# Patient Record
Sex: MALE | Race: WHITE | NOT HISPANIC OR LATINO | Employment: OTHER | URBAN - NONMETROPOLITAN AREA
[De-identification: names, ages, dates, MRNs, and addresses within clinical notes are randomized per-mention and may not be internally consistent; named-entity substitution may affect disease eponyms.]

---

## 2023-04-18 DIAGNOSIS — I10 BENIGN ESSENTIAL HYPERTENSION: ICD-10-CM

## 2023-04-18 RX ORDER — LISINOPRIL 20 MG/1
20 TABLET ORAL DAILY
Qty: 90 TABLET | Refills: 3 | Status: SHIPPED | OUTPATIENT
Start: 2023-04-18

## 2023-04-18 RX ORDER — LISINOPRIL 20 MG/1
20 TABLET ORAL DAILY
COMMUNITY
Start: 2020-09-14 | End: 2023-04-18 | Stop reason: SDUPTHER

## 2023-11-14 ENCOUNTER — OFFICE VISIT (OUTPATIENT)
Dept: PRIMARY CARE | Facility: CLINIC | Age: 63
End: 2023-11-14
Payer: OTHER GOVERNMENT

## 2023-11-14 ENCOUNTER — LAB (OUTPATIENT)
Dept: LAB | Facility: LAB | Age: 63
End: 2023-11-14
Payer: OTHER GOVERNMENT

## 2023-11-14 VITALS
SYSTOLIC BLOOD PRESSURE: 125 MMHG | HEIGHT: 72 IN | BODY MASS INDEX: 34.54 KG/M2 | HEART RATE: 77 BPM | DIASTOLIC BLOOD PRESSURE: 76 MMHG | WEIGHT: 255 LBS

## 2023-11-14 DIAGNOSIS — Z00.00 ENCOUNTER FOR GENERAL ADULT MEDICAL EXAMINATION WITHOUT ABNORMAL FINDINGS: Primary | ICD-10-CM

## 2023-11-14 DIAGNOSIS — R73.02 GLUCOSE INTOLERANCE (IMPAIRED GLUCOSE TOLERANCE): ICD-10-CM

## 2023-11-14 DIAGNOSIS — E78.00 HYPERCHOLESTEREMIA: ICD-10-CM

## 2023-11-14 DIAGNOSIS — R73.02 IMPAIRED GLUCOSE TOLERANCE (ORAL): ICD-10-CM

## 2023-11-14 DIAGNOSIS — E78.00 PURE HYPERCHOLESTEROLEMIA, UNSPECIFIED: ICD-10-CM

## 2023-11-14 DIAGNOSIS — Z00.00 ENCOUNTER FOR WELLNESS EXAMINATION IN ADULT: Primary | ICD-10-CM

## 2023-11-14 DIAGNOSIS — I10 BENIGN ESSENTIAL HYPERTENSION: ICD-10-CM

## 2023-11-14 DIAGNOSIS — Z12.5 SCREENING PSA (PROSTATE SPECIFIC ANTIGEN): ICD-10-CM

## 2023-11-14 DIAGNOSIS — G47.33 OBSTRUCTIVE SLEEP APNEA, ADULT: ICD-10-CM

## 2023-11-14 PROBLEM — D12.6 TUBULAR ADENOMA OF COLON: Status: RESOLVED | Noted: 2023-11-14 | Resolved: 2023-11-14

## 2023-11-14 PROBLEM — G47.10 HYPERSOMNIA: Status: ACTIVE | Noted: 2023-11-14

## 2023-11-14 PROBLEM — H91.93 BILATERAL HEARING LOSS: Status: ACTIVE | Noted: 2023-11-14

## 2023-11-14 LAB
ALBUMIN SERPL BCP-MCNC: 4.7 G/DL (ref 3.4–5)
ALP SERPL-CCNC: 65 U/L (ref 33–136)
ALT SERPL W P-5'-P-CCNC: 20 U/L (ref 10–52)
ANION GAP SERPL CALC-SCNC: 10 MMOL/L (ref 10–20)
AST SERPL W P-5'-P-CCNC: 17 U/L (ref 9–39)
BASOPHILS # BLD AUTO: 0.05 X10*3/UL (ref 0–0.1)
BASOPHILS NFR BLD AUTO: 0.8 %
BILIRUB SERPL-MCNC: 0.6 MG/DL (ref 0–1.2)
BUN SERPL-MCNC: 14 MG/DL (ref 6–23)
CALCIUM SERPL-MCNC: 9.4 MG/DL (ref 8.6–10.3)
CHLORIDE SERPL-SCNC: 103 MMOL/L (ref 98–107)
CHOLEST SERPL-MCNC: 186 MG/DL (ref 0–199)
CHOLESTEROL/HDL RATIO: 5.5
CO2 SERPL-SCNC: 30 MMOL/L (ref 21–32)
CREAT SERPL-MCNC: 0.91 MG/DL (ref 0.5–1.3)
EOSINOPHIL # BLD AUTO: 0.19 X10*3/UL (ref 0–0.7)
EOSINOPHIL NFR BLD AUTO: 3 %
ERYTHROCYTE [DISTWIDTH] IN BLOOD BY AUTOMATED COUNT: 12.6 % (ref 11.5–14.5)
EST. AVERAGE GLUCOSE BLD GHB EST-MCNC: 108 MG/DL
GFR SERPL CREATININE-BSD FRML MDRD: >90 ML/MIN/1.73M*2
GLUCOSE SERPL-MCNC: 107 MG/DL (ref 74–99)
HBA1C MFR BLD: 5.4 %
HCT VFR BLD AUTO: 45.9 % (ref 41–52)
HDLC SERPL-MCNC: 34 MG/DL
HGB BLD-MCNC: 14.7 G/DL (ref 13.5–17.5)
IMM GRANULOCYTES # BLD AUTO: 0.01 X10*3/UL (ref 0–0.7)
IMM GRANULOCYTES NFR BLD AUTO: 0.2 % (ref 0–0.9)
LDLC SERPL CALC-MCNC: 122 MG/DL
LYMPHOCYTES # BLD AUTO: 1.88 X10*3/UL (ref 1.2–4.8)
LYMPHOCYTES NFR BLD AUTO: 29.8 %
MCH RBC QN AUTO: 29.4 PG (ref 26–34)
MCHC RBC AUTO-ENTMCNC: 32 G/DL (ref 32–36)
MCV RBC AUTO: 92 FL (ref 80–100)
MONOCYTES # BLD AUTO: 0.51 X10*3/UL (ref 0.1–1)
MONOCYTES NFR BLD AUTO: 8.1 %
NEUTROPHILS # BLD AUTO: 3.67 X10*3/UL (ref 1.2–7.7)
NEUTROPHILS NFR BLD AUTO: 58.1 %
NON HDL CHOLESTEROL: 152 MG/DL (ref 0–149)
NRBC BLD-RTO: 0 /100 WBCS (ref 0–0)
PLATELET # BLD AUTO: 301 X10*3/UL (ref 150–450)
POTASSIUM SERPL-SCNC: 4.9 MMOL/L (ref 3.5–5.3)
PROT SERPL-MCNC: 7 G/DL (ref 6.4–8.2)
PSA SERPL-MCNC: 0.47 NG/ML
RBC # BLD AUTO: 5 X10*6/UL (ref 4.5–5.9)
SODIUM SERPL-SCNC: 138 MMOL/L (ref 136–145)
TRIGL SERPL-MCNC: 152 MG/DL (ref 0–149)
TSH SERPL-ACNC: 1.5 MIU/L (ref 0.44–3.98)
VLDL: 30 MG/DL (ref 0–40)
WBC # BLD AUTO: 6.3 X10*3/UL (ref 4.4–11.3)

## 2023-11-14 PROCEDURE — 84443 ASSAY THYROID STIM HORMONE: CPT

## 2023-11-14 PROCEDURE — 99396 PREV VISIT EST AGE 40-64: CPT | Performed by: PHYSICIAN ASSISTANT

## 2023-11-14 PROCEDURE — 84153 ASSAY OF PSA TOTAL: CPT

## 2023-11-14 PROCEDURE — 3078F DIAST BP <80 MM HG: CPT | Performed by: PHYSICIAN ASSISTANT

## 2023-11-14 PROCEDURE — 36415 COLL VENOUS BLD VENIPUNCTURE: CPT

## 2023-11-14 PROCEDURE — 3074F SYST BP LT 130 MM HG: CPT | Performed by: PHYSICIAN ASSISTANT

## 2023-11-14 PROCEDURE — 80053 COMPREHEN METABOLIC PANEL: CPT

## 2023-11-14 PROCEDURE — 80061 LIPID PANEL: CPT

## 2023-11-14 PROCEDURE — 85025 COMPLETE CBC W/AUTO DIFF WBC: CPT

## 2023-11-14 PROCEDURE — 1036F TOBACCO NON-USER: CPT | Performed by: PHYSICIAN ASSISTANT

## 2023-11-14 PROCEDURE — 83036 HEMOGLOBIN GLYCOSYLATED A1C: CPT

## 2023-11-14 ASSESSMENT — PATIENT HEALTH QUESTIONNAIRE - PHQ9
SUM OF ALL RESPONSES TO PHQ9 QUESTIONS 1 AND 2: 0
2. FEELING DOWN, DEPRESSED OR HOPELESS: NOT AT ALL
1. LITTLE INTEREST OR PLEASURE IN DOING THINGS: NOT AT ALL

## 2023-11-14 ASSESSMENT — ENCOUNTER SYMPTOMS
EYE DISCHARGE: 0
SHORTNESS OF BREATH: 0
CHILLS: 0
ABDOMINAL PAIN: 0
CONFUSION: 0
SINUS PAIN: 0
HEADACHES: 0
PALPITATIONS: 0
DIZZINESS: 0
TREMORS: 0
NAUSEA: 0
NECK PAIN: 0
SLEEP DISTURBANCE: 0
FREQUENCY: 0
CONSTIPATION: 0
NUMBNESS: 0
CHEST TIGHTNESS: 0
BACK PAIN: 0
WOUND: 0
BRUISES/BLEEDS EASILY: 0
WHEEZING: 0
COUGH: 0
EYE REDNESS: 0
FATIGUE: 0
SORE THROAT: 0
RHINORRHEA: 0
FEVER: 0
DIARRHEA: 0
VOMITING: 0
FLANK PAIN: 0

## 2023-11-14 NOTE — PROGRESS NOTES
Subjective   Patient ID: Paul Son is a 63 y.o. male who presents for Follow-up (WELLNESS WITH LABS)    HPI   wellness visit     - labs     - meds  HTN - stable on meds   SINDY -- pt is using [employer] in [place] and has tried 2 diff masks but cont to struggle using CPAP and often finds he is taking it off in the middle of the night. he cont to wear it as best as he can. He states he was told he already passed is compliance - will see if we can get record of this. He is using as faithful as he can but unfort is not sure he is seeing much benefit yet at this time -will cont to use     - Preventative Testing   PSA - oct 2023  colonoscopy - aug 2014- repeat 10 years per patient however I have found report and see that he has hx of tubular adenoma - I explained typically repeat would be 3 years with this type of polyps - he will call when he is ready to pursue    - vaccines  flu - declines  PNA- due age 65  shingles - discussed shingrix   Tdap - June 2014  MMR- pt denies wanting titer  Hep B - pt denies wanting titer  COVID - 2 shots and booster   RSV    - other Providers   Dentist - dr adams - goes 1-2 x/year   eye - - pt goes at least 1 /years         Patient Active Problem List   Diagnosis    Benign essential hypertension    Bilateral hearing loss    Glucose intolerance (impaired glucose tolerance)    Hypercholesteremia    Hypersomnia    Obstructive sleep apnea, adult       Review of Systems   Constitutional:  Negative for chills, fatigue and fever.   HENT:  Negative for congestion, rhinorrhea, sinus pain, sore throat and tinnitus.    Eyes:  Negative for discharge, redness and visual disturbance.   Respiratory:  Negative for cough, chest tightness, shortness of breath and wheezing.    Cardiovascular:  Negative for chest pain, palpitations and leg swelling.   Gastrointestinal:  Negative for abdominal pain, constipation, diarrhea, nausea and vomiting.   Endocrine: Negative for cold intolerance and heat  intolerance.   Genitourinary:  Negative for flank pain, frequency and urgency.   Musculoskeletal:  Negative for back pain, gait problem and neck pain.   Skin:  Negative for rash and wound.   Neurological:  Negative for dizziness, tremors, syncope, numbness and headaches.   Hematological:  Does not bruise/bleed easily.   Psychiatric/Behavioral:  Negative for confusion, sleep disturbance and suicidal ideas.        Past Medical History:   Diagnosis Date    Encounter for screening for malignant neoplasm of prostate     Prostate cancer screening    Other specified health status     No pertinent past medical history    Tubular adenoma of colon 11/14/2023       Past Surgical History:   Procedure Laterality Date    COLONOSCOPY  08/17/2014    POLYP REPEAT 3 YRS    OTHER SURGICAL HISTORY  10/01/2020    Tonsillectomy    OTHER SURGICAL HISTORY  10/08/2021    Cataract surgery       No family history on file.    Social History     Tobacco Use    Smoking status: Former     Types: Cigarettes    Smokeless tobacco: Never   Vaping Use    Vaping Use: Never used   Substance Use Topics    Alcohol use: Never    Drug use: Never       No Known Allergies    Current Outpatient Medications   Medication Sig Dispense Refill    lisinopril 20 mg tablet Take 1 tablet (20 mg) by mouth once daily. 90 tablet 3     No current facility-administered medications for this visit.       Objective   /76   Pulse 77   Ht 1.829 m (6')   Wt 116 kg (255 lb)   BMI 34.58 kg/m²     Physical Exam  Vitals reviewed.   Constitutional:       Appearance: Normal appearance. He is obese.   HENT:      Head: Normocephalic.      Right Ear: External ear normal.      Left Ear: External ear normal.      Nose: Nose normal. No congestion or rhinorrhea.      Mouth/Throat:      Mouth: Mucous membranes are moist.   Eyes:      Extraocular Movements: Extraocular movements intact.      Conjunctiva/sclera: Conjunctivae normal.      Pupils: Pupils are equal, round, and reactive to  light.   Cardiovascular:      Rate and Rhythm: Normal rate and regular rhythm.      Pulses: Normal pulses.   Pulmonary:      Effort: Pulmonary effort is normal.      Breath sounds: Normal breath sounds.   Abdominal:      General: Bowel sounds are normal.      Palpations: Abdomen is soft.      Tenderness: There is no abdominal tenderness. There is no right CVA tenderness or left CVA tenderness.   Musculoskeletal:         General: No tenderness. Normal range of motion.      Cervical back: Normal range of motion and neck supple. No tenderness.   Skin:     General: Skin is warm and dry.   Neurological:      General: No focal deficit present.      Mental Status: He is alert and oriented to person, place, and time.   Psychiatric:         Mood and Affect: Mood normal.         Behavior: Behavior normal.         Testing   Component      Latest Ref [facility] 11/14/2023   WBC      4.4 - 11.3 x10*3/uL 6.3    nRBC      0.0 - 0.0 /100 WBCs 0.0    RBC      4.50 - 5.90 x10*6/uL 5.00    HEMOGLOBIN      13.5 - 17.5 g/dL 14.7    HEMATOCRIT      41.0 - 52.0 % 45.9    MCV      80 - 100 fL 92    MCH      26.0 - 34.0 pg 29.4    MCHC      32.0 - 36.0 g/dL 32.0    RED CELL DISTRIBUTION WIDTH      11.5 - 14.5 % 12.6    Platelets      150 - 450 x10*3/uL 301    Neutrophils %      40.0 - 80.0 % 58.1    Immature Granulocytes %, Automated      0.0 - 0.9 % 0.2    Lymphocytes %      13.0 - 44.0 % 29.8    Monocytes %      2.0 - 10.0 % 8.1    Eosinophils %      0.0 - 6.0 % 3.0    Basophils %      0.0 - 2.0 % 0.8    Neutrophils Absolute      1.20 - 7.70 x10*3/uL 3.67    Immature Granulocytes Absolute, Automated      0.00 - 0.70 x10*3/uL 0.01    Lymphocytes Absolute      1.20 - 4.80 x10*3/uL 1.88    Monocytes Absolute      0.10 - 1.00 x10*3/uL 0.51    Eosinophils Absolute      0.00 - 0.70 x10*3/uL 0.19    Basophils Absolute      0.00 - 0.10 x10*3/uL 0.05    GLUCOSE      74 - 99 mg/dL 107 (H)    SODIUM      136 - 145 mmol/L 138    POTASSIUM      3.5 - 5.3  mmol/L 4.9    CHLORIDE      98 - 107 mmol/L 103    Bicarbonate      21 - 32 mmol/L 30    Anion Gap      10 - 20 mmol/L 10    Blood Urea Nitrogen      6 - 23 mg/dL 14    Creatinine      0.50 - 1.30 mg/dL 0.91    EGFR      >60 mL/min/1.73m*2 >90    Calcium      8.6 - 10.3 mg/dL 9.4    Albumin      3.4 - 5.0 g/dL 4.7    Alkaline Phosphatase      33 - 136 U/L 65    Total Protein      6.4 - 8.2 g/dL 7.0    AST      9 - 39 U/L 17    Bilirubin Total      0.0 - 1.2 mg/dL 0.6    ALT      10 - 52 U/L 20    CHOLESTEROL      0 - 199 mg/dL 186    HDL CHOLESTEROL      mg/dL 34.0    Cholesterol/HDL Ratio 5.5    LDL Calculated      <=99 mg/dL 122 (H)    VLDL      0 - 40 mg/dL 30    TRIGLYCERIDES      0 - 149 mg/dL 152 (H)    Non HDL Cholesterol      0 - 149 mg/dL 152 (H)    Hemoglobin A1C      see below % 5.4    Estimated Average Glucose      Not Established mg/dL 108    Thyroid Stimulating Hormone      0.44 - 3.98 mIU/L 1.50    PSA      <=4.00 ng/mL 0.47       Impression    MDM    1) COMPLEXITY: MORE THAN 1 STABLE CHRONIC CONDITION ADDRESSED  2)DATA: TESTS INTERPRETED AND OR ORDERED, TOOK INDEPENDENT HISTORY OR RECORDS REVIEWED  3)RISK: MODERATE RISK DUE TO NATURE OF MEDICAL CONDITIONS/COMORBIDITY OR MEDICATIONS ORDERED OR SURGICAL OR PROCEDURE REFERRAL, .       Reviewed labs and Testing on file   Patient to follow diet low in cholesterol, fat, and sodium.    Patient is advised to increase Exercise.  Patient is recommended to lose weight.  Reviewed Meds and discussed common side effects  Continue as directed   Patient is strongly advised to be compliant with recommendations.    Return to Clinic sooner if needed.  Patient denies further questions/concerns at this time     Assessment/Plan   Problem List Items Addressed This Visit             ICD-10-CM    Benign essential hypertension I10    Glucose intolerance (impaired glucose tolerance) R73.02    Relevant Orders    CBC and Auto Differential    Comprehensive Metabolic Panel     Hemoglobin A1C    Lipid Panel    Magnesium    Vitamin B12    Hypercholesteremia E78.00    Relevant Orders    CBC and Auto Differential    Comprehensive Metabolic Panel    Hemoglobin A1C    Lipid Panel    Magnesium    Vitamin B12    Obstructive sleep apnea, adult G47.33     Other Visit Diagnoses         Codes    Encounter for wellness examination in adult    -  Primary Z00.00    Relevant Orders    CBC and Auto Differential    Comprehensive Metabolic Panel    Hemoglobin A1C    Lipid Panel    Magnesium    Vitamin B12    Screening PSA (prostate specific antigen)     Z12.5    Relevant Orders    Prostate Specific Antigen, Screen             FU in 12 + 1 with wellness and LABS at [facility] fasting and med check

## 2023-11-20 ENCOUNTER — APPOINTMENT (OUTPATIENT)
Dept: PRIMARY CARE | Facility: CLINIC | Age: 63
End: 2023-11-20
Payer: OTHER GOVERNMENT

## 2024-07-11 DIAGNOSIS — I10 BENIGN ESSENTIAL HYPERTENSION: ICD-10-CM

## 2024-07-11 RX ORDER — LISINOPRIL 20 MG/1
20 TABLET ORAL DAILY
Qty: 14 TABLET | Refills: 0 | Status: SHIPPED | OUTPATIENT
Start: 2024-07-11 | End: 2025-07-11

## 2024-07-11 RX ORDER — LISINOPRIL 20 MG/1
20 TABLET ORAL DAILY
Qty: 90 TABLET | Refills: 3 | Status: SHIPPED | OUTPATIENT
Start: 2024-07-11

## 2024-11-18 ENCOUNTER — APPOINTMENT (OUTPATIENT)
Dept: PRIMARY CARE | Facility: CLINIC | Age: 64
End: 2024-11-18
Payer: OTHER GOVERNMENT

## 2024-11-19 ENCOUNTER — TELEPHONE (OUTPATIENT)
Dept: PRIMARY CARE | Facility: CLINIC | Age: 64
End: 2024-11-19
Payer: OTHER GOVERNMENT

## 2024-11-19 DIAGNOSIS — Z12.5 SCREENING PSA (PROSTATE SPECIFIC ANTIGEN): ICD-10-CM

## 2024-11-19 DIAGNOSIS — E78.00 HYPERCHOLESTEREMIA: ICD-10-CM

## 2024-11-19 DIAGNOSIS — Z00.00 ENCOUNTER FOR WELLNESS EXAMINATION IN ADULT: Primary | ICD-10-CM

## 2024-11-19 DIAGNOSIS — R73.02 GLUCOSE INTOLERANCE (IMPAIRED GLUCOSE TOLERANCE): ICD-10-CM

## 2024-11-19 DIAGNOSIS — I10 BENIGN ESSENTIAL HYPERTENSION: ICD-10-CM

## 2024-11-23 ENCOUNTER — LAB (OUTPATIENT)
Dept: LAB | Facility: LAB | Age: 64
End: 2024-11-23
Payer: OTHER GOVERNMENT

## 2024-11-23 DIAGNOSIS — Z00.00 ENCOUNTER FOR WELLNESS EXAMINATION IN ADULT: ICD-10-CM

## 2024-11-23 DIAGNOSIS — Z12.5 SCREENING PSA (PROSTATE SPECIFIC ANTIGEN): ICD-10-CM

## 2024-11-23 DIAGNOSIS — I10 BENIGN ESSENTIAL HYPERTENSION: ICD-10-CM

## 2024-11-23 DIAGNOSIS — E78.00 HYPERCHOLESTEREMIA: ICD-10-CM

## 2024-11-23 DIAGNOSIS — R73.02 GLUCOSE INTOLERANCE (IMPAIRED GLUCOSE TOLERANCE): ICD-10-CM

## 2024-11-23 LAB
ALBUMIN SERPL BCP-MCNC: 4.3 G/DL (ref 3.4–5)
ALP SERPL-CCNC: 69 U/L (ref 33–136)
ALT SERPL W P-5'-P-CCNC: 40 U/L (ref 10–52)
ANION GAP SERPL CALC-SCNC: 10 MMOL/L (ref 10–20)
AST SERPL W P-5'-P-CCNC: 28 U/L (ref 9–39)
BASOPHILS # BLD AUTO: 0.05 X10*3/UL (ref 0–0.1)
BASOPHILS NFR BLD AUTO: 0.8 %
BILIRUB SERPL-MCNC: 0.7 MG/DL (ref 0–1.2)
BUN SERPL-MCNC: 15 MG/DL (ref 6–23)
CALCIUM SERPL-MCNC: 8.9 MG/DL (ref 8.6–10.3)
CHLORIDE SERPL-SCNC: 106 MMOL/L (ref 98–107)
CHOLEST SERPL-MCNC: 188 MG/DL (ref 0–199)
CHOLESTEROL/HDL RATIO: 6.1
CO2 SERPL-SCNC: 29 MMOL/L (ref 21–32)
CREAT SERPL-MCNC: 0.82 MG/DL (ref 0.5–1.3)
EGFRCR SERPLBLD CKD-EPI 2021: >90 ML/MIN/1.73M*2
EOSINOPHIL # BLD AUTO: 0.33 X10*3/UL (ref 0–0.7)
EOSINOPHIL NFR BLD AUTO: 5.5 %
ERYTHROCYTE [DISTWIDTH] IN BLOOD BY AUTOMATED COUNT: 12.7 % (ref 11.5–14.5)
EST. AVERAGE GLUCOSE BLD GHB EST-MCNC: 114 MG/DL
GLUCOSE SERPL-MCNC: 97 MG/DL (ref 74–99)
HBA1C MFR BLD: 5.6 %
HCT VFR BLD AUTO: 46 % (ref 41–52)
HDLC SERPL-MCNC: 31 MG/DL
HGB BLD-MCNC: 14.9 G/DL (ref 13.5–17.5)
IMM GRANULOCYTES # BLD AUTO: 0.01 X10*3/UL (ref 0–0.7)
IMM GRANULOCYTES NFR BLD AUTO: 0.2 % (ref 0–0.9)
LDLC SERPL CALC-MCNC: 119 MG/DL
LYMPHOCYTES # BLD AUTO: 2.15 X10*3/UL (ref 1.2–4.8)
LYMPHOCYTES NFR BLD AUTO: 36.1 %
MAGNESIUM SERPL-MCNC: 2.12 MG/DL (ref 1.6–2.4)
MCH RBC QN AUTO: 29.7 PG (ref 26–34)
MCHC RBC AUTO-ENTMCNC: 32.4 G/DL (ref 32–36)
MCV RBC AUTO: 92 FL (ref 80–100)
MONOCYTES # BLD AUTO: 0.51 X10*3/UL (ref 0.1–1)
MONOCYTES NFR BLD AUTO: 8.6 %
NEUTROPHILS # BLD AUTO: 2.9 X10*3/UL (ref 1.2–7.7)
NEUTROPHILS NFR BLD AUTO: 48.8 %
NON HDL CHOLESTEROL: 157 MG/DL (ref 0–149)
NRBC BLD-RTO: 0 /100 WBCS (ref 0–0)
PLATELET # BLD AUTO: 260 X10*3/UL (ref 150–450)
POTASSIUM SERPL-SCNC: 4.7 MMOL/L (ref 3.5–5.3)
PROT SERPL-MCNC: 6.3 G/DL (ref 6.4–8.2)
PSA SERPL-MCNC: 0.39 NG/ML
RBC # BLD AUTO: 5.01 X10*6/UL (ref 4.5–5.9)
SODIUM SERPL-SCNC: 140 MMOL/L (ref 136–145)
TRIGL SERPL-MCNC: 189 MG/DL (ref 0–149)
TSH SERPL-ACNC: 1.16 MIU/L (ref 0.44–3.98)
VIT B12 SERPL-MCNC: 185 PG/ML (ref 211–911)
VLDL: 38 MG/DL (ref 0–40)
WBC # BLD AUTO: 6 X10*3/UL (ref 4.4–11.3)

## 2024-11-23 PROCEDURE — 80061 LIPID PANEL: CPT

## 2024-11-23 PROCEDURE — 83735 ASSAY OF MAGNESIUM: CPT

## 2024-11-23 PROCEDURE — 80053 COMPREHEN METABOLIC PANEL: CPT

## 2024-11-23 PROCEDURE — 85025 COMPLETE CBC W/AUTO DIFF WBC: CPT

## 2024-11-23 PROCEDURE — 83036 HEMOGLOBIN GLYCOSYLATED A1C: CPT

## 2024-11-23 PROCEDURE — 36415 COLL VENOUS BLD VENIPUNCTURE: CPT

## 2024-11-23 PROCEDURE — 84153 ASSAY OF PSA TOTAL: CPT

## 2024-11-23 PROCEDURE — 82607 VITAMIN B-12: CPT

## 2024-11-23 PROCEDURE — 84443 ASSAY THYROID STIM HORMONE: CPT

## 2024-11-27 ENCOUNTER — APPOINTMENT (OUTPATIENT)
Dept: PRIMARY CARE | Facility: CLINIC | Age: 64
End: 2024-11-27
Payer: OTHER GOVERNMENT

## 2024-11-27 VITALS
SYSTOLIC BLOOD PRESSURE: 136 MMHG | HEIGHT: 72 IN | WEIGHT: 271.4 LBS | HEART RATE: 60 BPM | BODY MASS INDEX: 36.76 KG/M2 | DIASTOLIC BLOOD PRESSURE: 64 MMHG

## 2024-11-27 DIAGNOSIS — Z00.00 ENCOUNTER FOR WELLNESS EXAMINATION IN ADULT: Primary | ICD-10-CM

## 2024-11-27 DIAGNOSIS — G47.33 OBSTRUCTIVE SLEEP APNEA, ADULT: ICD-10-CM

## 2024-11-27 DIAGNOSIS — R73.02 GLUCOSE INTOLERANCE (IMPAIRED GLUCOSE TOLERANCE): ICD-10-CM

## 2024-11-27 DIAGNOSIS — Z12.5 SCREENING PSA (PROSTATE SPECIFIC ANTIGEN): ICD-10-CM

## 2024-11-27 DIAGNOSIS — E78.00 HYPERCHOLESTEREMIA: ICD-10-CM

## 2024-11-27 DIAGNOSIS — I10 BENIGN ESSENTIAL HYPERTENSION: ICD-10-CM

## 2024-11-27 DIAGNOSIS — E53.8 VITAMIN B12 DEFICIENCY: ICD-10-CM

## 2024-11-27 PROCEDURE — 99396 PREV VISIT EST AGE 40-64: CPT | Performed by: PHYSICIAN ASSISTANT

## 2024-11-27 PROCEDURE — 3075F SYST BP GE 130 - 139MM HG: CPT | Performed by: PHYSICIAN ASSISTANT

## 2024-11-27 PROCEDURE — 3008F BODY MASS INDEX DOCD: CPT | Performed by: PHYSICIAN ASSISTANT

## 2024-11-27 PROCEDURE — 3078F DIAST BP <80 MM HG: CPT | Performed by: PHYSICIAN ASSISTANT

## 2024-11-27 PROCEDURE — 1036F TOBACCO NON-USER: CPT | Performed by: PHYSICIAN ASSISTANT

## 2024-11-27 RX ORDER — LANOLIN ALCOHOL/MO/W.PET/CERES
1000 CREAM (GRAM) TOPICAL DAILY
Qty: 90 TABLET | Refills: 3 | Status: SHIPPED | OUTPATIENT
Start: 2024-11-27 | End: 2025-11-27

## 2024-11-27 ASSESSMENT — ENCOUNTER SYMPTOMS
EYE REDNESS: 0
BRUISES/BLEEDS EASILY: 0
FLANK PAIN: 0
CONSTIPATION: 0
WHEEZING: 0
COUGH: 0
RHINORRHEA: 0
FEVER: 0
BACK PAIN: 0
WOUND: 0
SLEEP DISTURBANCE: 0
CONFUSION: 0
SINUS PAIN: 0
CHILLS: 0
SORE THROAT: 0
NUMBNESS: 0
DIZZINESS: 0
CHEST TIGHTNESS: 0
EYE DISCHARGE: 0
TREMORS: 0
FREQUENCY: 0
DIARRHEA: 0
NAUSEA: 0
VOMITING: 0
ABDOMINAL PAIN: 0
PALPITATIONS: 0
SHORTNESS OF BREATH: 0
NECK PAIN: 0
HEADACHES: 0
FATIGUE: 0

## 2024-11-27 ASSESSMENT — PATIENT HEALTH QUESTIONNAIRE - PHQ9
1. LITTLE INTEREST OR PLEASURE IN DOING THINGS: NOT AT ALL
2. FEELING DOWN, DEPRESSED OR HOPELESS: NOT AT ALL
SUM OF ALL RESPONSES TO PHQ9 QUESTIONS 1 AND 2: 0

## 2024-11-27 NOTE — PROGRESS NOTES
Subjective   Patient ID: Paul Son is a 64 y.o. male who presents for Follow-up (WELLNESS WITH LABS)    [facility]       wellness visit      - labs      - meds  HTN - stable on meds   SINDY -- has CPAP but not using - struggling to use      - Preventative Testing   PSA - Nov 2024   colonoscopy - aug 2014- repeat 10 years per patient however I have found report and see that he has hx of tubular adenoma - I explained typically repeat would be 3 years with this type of polyps - he will call when he is ready to pursue     - vaccines  flu - declines  PNA- due age 65  shingles - discussed shingrix   Tdap - June 2014  MMR- pt denies wanting titer  Hep B - pt denies wanting titer  COVID - 2 shots and booster   RSV     - other Providers   Dentist - dr adams - goes 1-2 x/year   eye - - pt goes at least 1 /years             Patient Active Problem List   Diagnosis    Benign essential hypertension    Bilateral hearing loss    Glucose intolerance (impaired glucose tolerance)    Hypercholesteremia    Hypersomnia    Obstructive sleep apnea, adult       Review of Systems   Constitutional:  Negative for chills, fatigue and fever.   HENT:  Negative for congestion, rhinorrhea, sinus pain, sore throat and tinnitus.    Eyes:  Negative for discharge, redness and visual disturbance.   Respiratory:  Negative for cough, chest tightness, shortness of breath and wheezing.    Cardiovascular:  Negative for chest pain, palpitations and leg swelling.   Gastrointestinal:  Negative for abdominal pain, constipation, diarrhea, nausea and vomiting.   Endocrine: Negative for cold intolerance and heat intolerance.   Genitourinary:  Negative for flank pain, frequency and urgency.   Musculoskeletal:  Negative for back pain, gait problem and neck pain.   Skin:  Negative for rash and wound.   Neurological:  Negative for dizziness, tremors, syncope, numbness and headaches.   Hematological:  Does not bruise/bleed easily.   Psychiatric/Behavioral:  Negative  for confusion, sleep disturbance and suicidal ideas.        Past Medical History:   Diagnosis Date    Encounter for screening for malignant neoplasm of prostate     Prostate cancer screening    Other specified health status     No pertinent past medical history    Tubular adenoma of colon 11/14/2023       Past Surgical History:   Procedure Laterality Date    COLONOSCOPY  08/17/2014    POLYP REPEAT 3 YRS    OTHER SURGICAL HISTORY  10/01/2020    Tonsillectomy    OTHER SURGICAL HISTORY  10/08/2021    Cataract surgery       Family History   Problem Relation Name Age of Onset    Breast cancer Mother      Leukemia Father      Colon cancer Father         Social History     Tobacco Use    Smoking status: Former     Types: Cigarettes    Smokeless tobacco: Never   Vaping Use    Vaping status: Never Used   Substance Use Topics    Alcohol use: Never    Drug use: Never       No Known Allergies    Current Outpatient Medications   Medication Sig Dispense Refill    lisinopril 20 mg tablet Take 1 tablet (20 mg) by mouth once daily. 90 tablet 3    lisinopril 20 mg tablet Take 1 tablet (20 mg) by mouth once daily. 14 tablet 0     No current facility-administered medications for this visit.       Objective   /64   Pulse 60   Ht 1.829 m (6')   Wt 123 kg (271 lb 6.4 oz)   BMI 36.81 kg/m²     Physical Exam  Vitals reviewed.   Constitutional:       Appearance: Normal appearance. He is obese.   HENT:      Head: Normocephalic.      Right Ear: External ear normal.      Left Ear: External ear normal.      Nose: Nose normal. No congestion or rhinorrhea.      Mouth/Throat:      Mouth: Mucous membranes are moist.   Eyes:      Extraocular Movements: Extraocular movements intact.      Conjunctiva/sclera: Conjunctivae normal.      Pupils: Pupils are equal, round, and reactive to light.   Cardiovascular:      Rate and Rhythm: Normal rate and regular rhythm.      Pulses: Normal pulses.   Pulmonary:      Effort: Pulmonary effort is normal.       Breath sounds: Normal breath sounds.   Abdominal:      General: Bowel sounds are normal.      Palpations: Abdomen is soft.      Tenderness: There is no abdominal tenderness. There is no right CVA tenderness or left CVA tenderness.   Musculoskeletal:         General: No tenderness. Normal range of motion.      Cervical back: Normal range of motion and neck supple. No tenderness.   Skin:     General: Skin is warm and dry.   Neurological:      General: No focal deficit present.      Mental Status: He is alert and oriented to person, place, and time.   Psychiatric:         Mood and Affect: Mood normal.         Behavior: Behavior normal.     Testing   Component      Latest Ref [facility] 11/23/2024   WBC      4.4 - 11.3 x10*3/uL 6.0    nRBC      0.0 - 0.0 /100 WBCs 0.0    RBC      4.50 - 5.90 x10*6/uL 5.01    HEMOGLOBIN      13.5 - 17.5 g/dL 14.9    HEMATOCRIT      41.0 - 52.0 % 46.0    MCV      80 - 100 fL 92    MCH      26.0 - 34.0 pg 29.7    MCHC      32.0 - 36.0 g/dL 32.4    RED CELL DISTRIBUTION WIDTH      11.5 - 14.5 % 12.7    Platelets      150 - 450 x10*3/uL 260    Neutrophils %      40.0 - 80.0 % 48.8    Immature Granulocytes %, Automated      0.0 - 0.9 % 0.2    Lymphocytes %      13.0 - 44.0 % 36.1    Monocytes %      2.0 - 10.0 % 8.6    Eosinophils %      0.0 - 6.0 % 5.5    Basophils %      0.0 - 2.0 % 0.8    Neutrophils Absolute      1.20 - 7.70 x10*3/uL 2.90    Immature Granulocytes Absolute, Automated      0.00 - 0.70 x10*3/uL 0.01    Lymphocytes Absolute      1.20 - 4.80 x10*3/uL 2.15    Monocytes Absolute      0.10 - 1.00 x10*3/uL 0.51    Eosinophils Absolute      0.00 - 0.70 x10*3/uL 0.33    Basophils Absolute      0.00 - 0.10 x10*3/uL 0.05    GLUCOSE      74 - 99 mg/dL 97    SODIUM      136 - 145 mmol/L 140    POTASSIUM      3.5 - 5.3 mmol/L 4.7    CHLORIDE      98 - 107 mmol/L 106    Bicarbonate      21 - 32 mmol/L 29    Anion Gap      10 - 20 mmol/L 10    Blood Urea Nitrogen      6 - 23 mg/dL 15     Creatinine      0.50 - 1.30 mg/dL 0.82    EGFR      >60 mL/min/1.73m*2 >90    Calcium      8.6 - 10.3 mg/dL 8.9    Albumin      3.4 - 5.0 g/dL 4.3    Alkaline Phosphatase      33 - 136 U/L 69    Total Protein      6.4 - 8.2 g/dL 6.3 (L)    AST      9 - 39 U/L 28    Bilirubin Total      0.0 - 1.2 mg/dL 0.7    ALT      10 - 52 U/L 40    CHOLESTEROL      0 - 199 mg/dL 188    HDL CHOLESTEROL      mg/dL 31.0    Cholesterol/HDL Ratio 6.1    LDL Calculated      <=99 mg/dL 119 (H)    VLDL      0 - 40 mg/dL 38    TRIGLYCERIDES      0 - 149 mg/dL 189 (H)    Non HDL Cholesterol      0 - 149 mg/dL 157 (H)    Hemoglobin A1C      See comment % 5.6    Estimated Average Glucose      Not Established mg/dL 114    Thyroid Stimulating Hormone      0.44 - 3.98 mIU/L 1.16    Prostate Specific Antigen,Screen      <=4.00 ng/mL 0.39    MAGNESIUM      1.60 - 2.40 mg/dL 2.12    Vitamin B12      211 - 911 pg/mL 185 (L)       Impression    MDM    1) COMPLEXITY: MORE THAN 1 STABLE CHRONIC CONDITION ADDRESSED  2)DATA: TESTS INTERPRETED AND OR ORDERED, TOOK INDEPENDENT HISTORY OR RECORDS REVIEWED  3)RISK: MODERATE RISK DUE TO NATURE OF MEDICAL CONDITIONS/COMORBIDITY OR MEDICATIONS ORDERED OR SURGICAL OR PROCEDURE REFERRAL, .       Reviewed labs and Testing on file   Patient to follow diet low in cholesterol, fat, and sodium.    Patient is advised to increase Exercise.  Patient is recommended to lose weight.  Reviewed Meds and discussed common side effects  Continue as directed   Hyperchol - work on diet and ex   Low B 12 - start supplement   Discussed approp preventative testing, vaccines and recommendations   Patient is strongly advised to be compliant with recommendations.    Return to Clinic sooner if needed.  Patient denies further questions/concerns at this time       Assessment/Plan   Problem List Items Addressed This Visit             ICD-10-CM    Benign essential hypertension I10    Glucose intolerance (impaired glucose tolerance) R73.02     Hypercholesteremia E78.00    Relevant Orders    CBC and Auto Differential    Comprehensive Metabolic Panel    Hemoglobin A1C    Lipid Panel    Thyroid Stimulating Hormone    Magnesium    Obstructive sleep apnea, adult G47.33    Vitamin B12 deficiency E53.8    Relevant Medications    cyanocobalamin (Vitamin B-12) 1,000 mcg tablet    Other Relevant Orders    Vitamin B12     Other Visit Diagnoses         Codes    Encounter for wellness examination in adult    -  Primary Z00.00    Relevant Orders    CBC and Auto Differential    Comprehensive Metabolic Panel    Hemoglobin A1C    Lipid Panel    Thyroid Stimulating Hormone    Magnesium    Prostate Specific Antigen, Screen    Vitamin B12    Screening PSA (prostate specific antigen)     Z12.5    Relevant Orders    Prostate Specific Antigen, Screen          FU in 12 +1 with wellness and labs at [facility] fasting and med check

## 2025-12-01 ENCOUNTER — APPOINTMENT (OUTPATIENT)
Dept: PRIMARY CARE | Facility: CLINIC | Age: 65
End: 2025-12-01
Payer: OTHER GOVERNMENT